# Patient Record
Sex: MALE | Race: BLACK OR AFRICAN AMERICAN | NOT HISPANIC OR LATINO | URBAN - METROPOLITAN AREA
[De-identification: names, ages, dates, MRNs, and addresses within clinical notes are randomized per-mention and may not be internally consistent; named-entity substitution may affect disease eponyms.]

---

## 2020-08-04 ENCOUNTER — OFFICE VISIT (OUTPATIENT)
Dept: URBAN - METROPOLITAN AREA CLINIC 33 | Facility: CLINIC | Age: 40
End: 2020-08-04

## 2020-08-04 VITALS
SYSTOLIC BLOOD PRESSURE: 140 MMHG | WEIGHT: 156 LBS | HEIGHT: 67 IN | DIASTOLIC BLOOD PRESSURE: 80 MMHG | BODY MASS INDEX: 24.48 KG/M2

## 2020-08-04 RX ORDER — MULTIVITAMIN
1 TABLET TABLET ORAL ONCE A DAY
Qty: 30 | Status: ACTIVE | COMMUNITY

## 2020-08-04 RX ORDER — PANTOPRAZOLE SODIUM 40 MG/1
1 TABLET TABLET, DELAYED RELEASE ORAL
Qty: 90 | Refills: 1 | OUTPATIENT
Start: 2020-08-04

## 2020-08-04 RX ORDER — PANTOPRAZOLE SODIUM 40 MG/1
TAKE 1 TABLET BY MOUTH EVERY DAY TABLET, DELAYED RELEASE ORAL
Qty: 30 UNSPECIFIED | Refills: 2 | Status: ACTIVE | COMMUNITY

## 2020-08-04 RX ORDER — ACETAMINOPHEN 500 MG
AS DIRECTED TABLET ORAL
Status: ON HOLD | COMMUNITY

## 2020-08-04 RX ORDER — NAPROXEN SODIUM 220 MG
AS DIRECTED TABLET ORAL
Status: ACTIVE | COMMUNITY

## 2020-08-04 NOTE — EXAM-MIGRATED EXAMINATIONS
GENERAL APPEARANCE: - alert, in no acute distress, well developed, well nourished;   HEAD: - normocephalic, atraumatic;   EYES: - sclera anicteric bilaterally;   ORAL CAVITY: - mucosa moist, MP 3;   NECK/THYROID: - neck supple, full range of motion, no thyromegaly, trachea midline;   SKIN: - warm and dry, no spider angiomata, palmar erythema or icterus;   HEART: - no murmurs, regular rate and rhythm, S1, S2 normal;   LUNGS: - clear to auscultation bilaterally, good air movement, no wheezes, rales, rhonchi;   ABDOMEN: - bowel sounds present, no masses palpable, no organomegaly , no rebound tenderness, soft, nontender, nondistended;   MUSCULOSKELETAL: - normal posture, normal gait and station, no decreased range of motion;   EXTREMITIES: - no clubbing, cyanosis, or edema;   PSYCH: - cooperative with exam, mood/affect full range;

## 2020-08-04 NOTE — HPI-MIGRATED HPI
;     Acid Reflux : 40 year old male presents today for consult due to longstanding history heartburn/reflux. Patient admits this first occurred 11 years ago, he was put on Nexium 40 mg. Since he has been off Nexium for 10 yrs. Things cleared and he has been doing well until March 2020. He received a temporary crown, and was taking Abx and Ibuprofen.   Recurrence of symptoms includes burning in chest, globus sensation and the feeling of food going down every time he swallows. He admits discomfort with dry food items like nuts.   No N/V.  No actual food getting stuck. No cough, no hoarseness. No wheezing. No abdominal pain.  He has BM's every day, 2 times a day. No blood in stool and no melena.  No weight loss.   Patient states that he is currently taking Pantoprazole 40 mg with relief of symptoms. It took 4-6 weeks after starting Pantoprazole before feeling better.    Denies a family history of esophageal and gastric cancers and/or diseases.    Patient has not had an EGD or Barium Swallow Test before.   ;

## 2020-08-27 ENCOUNTER — OFFICE VISIT (OUTPATIENT)
Dept: URBAN - METROPOLITAN AREA CLINIC 35 | Facility: CLINIC | Age: 40
End: 2020-08-27

## 2020-08-28 ENCOUNTER — OFFICE VISIT (OUTPATIENT)
Dept: URBAN - METROPOLITAN AREA CLINIC 35 | Facility: CLINIC | Age: 40
End: 2020-08-28

## 2020-09-03 ENCOUNTER — OFFICE VISIT (OUTPATIENT)
Dept: URBAN - METROPOLITAN AREA SURGERY CENTER 8 | Facility: SURGERY CENTER | Age: 40
End: 2020-09-03

## 2020-09-03 ENCOUNTER — TELEPHONE ENCOUNTER (OUTPATIENT)
Dept: URBAN - METROPOLITAN AREA CLINIC 35 | Facility: CLINIC | Age: 40
End: 2020-09-03

## 2020-09-03 RX ORDER — FLUCONAZOLE 200 MG/1
2 TABLETS TABLET ORAL ONCE
Qty: 2 | Refills: 0 | OUTPATIENT
Start: 2020-09-03

## 2020-09-03 RX ORDER — FLUCONAZOLE 200 MG/1
1 TABLET TABLET ORAL DAILY
Qty: 7 TABLET | Refills: 0 | OUTPATIENT
Start: 2020-09-03

## 2020-09-08 ENCOUNTER — TELEPHONE ENCOUNTER (OUTPATIENT)
Dept: URBAN - METROPOLITAN AREA CLINIC 35 | Facility: CLINIC | Age: 40
End: 2020-09-08

## 2020-09-16 ENCOUNTER — OFFICE VISIT (OUTPATIENT)
Dept: URBAN - METROPOLITAN AREA CLINIC 33 | Facility: CLINIC | Age: 40
End: 2020-09-16

## 2020-09-16 VITALS
HEIGHT: 67 IN | WEIGHT: 150 LBS | TEMPERATURE: 97.1 F | DIASTOLIC BLOOD PRESSURE: 80 MMHG | BODY MASS INDEX: 23.54 KG/M2 | SYSTOLIC BLOOD PRESSURE: 120 MMHG

## 2020-09-16 RX ORDER — FLUCONAZOLE 200 MG/1
1 TABLET TABLET ORAL DAILY
Qty: 7 TABLET | Refills: 0 | Status: ON HOLD | COMMUNITY
Start: 2020-09-03

## 2020-09-16 RX ORDER — NAPROXEN SODIUM 220 MG
AS DIRECTED TABLET ORAL
Status: ON HOLD | COMMUNITY

## 2020-09-16 RX ORDER — FLUCONAZOLE 200 MG/1
2 TABLETS TABLET ORAL ONCE
Qty: 2 | Refills: 0 | Status: ON HOLD | COMMUNITY
Start: 2020-09-03

## 2020-09-16 RX ORDER — PANTOPRAZOLE SODIUM 20 MG/1
1 TABLET TABLET, DELAYED RELEASE ORAL
Qty: 90 | Refills: 1 | OUTPATIENT
Start: 2020-09-16

## 2020-09-16 RX ORDER — PANTOPRAZOLE SODIUM 40 MG/1
1 TABLET TABLET, DELAYED RELEASE ORAL
Qty: 90 | Refills: 1 | Status: ACTIVE | COMMUNITY
Start: 2020-08-04

## 2020-09-16 RX ORDER — MULTIVITAMIN
1 TABLET TABLET ORAL ONCE A DAY
Qty: 30 | Status: ON HOLD | COMMUNITY

## 2020-09-16 RX ORDER — ACETAMINOPHEN 500 MG
AS DIRECTED TABLET ORAL
Status: ON HOLD | COMMUNITY

## 2020-09-16 RX ORDER — PANTOPRAZOLE SODIUM 40 MG/1
TAKE 1 TABLET BY MOUTH EVERY DAY TABLET, DELAYED RELEASE ORAL
Qty: 30 UNSPECIFIED | Refills: 2 | Status: ON HOLD | COMMUNITY

## 2021-01-13 ENCOUNTER — OFFICE VISIT (OUTPATIENT)
Dept: URBAN - METROPOLITAN AREA CLINIC 33 | Facility: CLINIC | Age: 41
End: 2021-01-13

## 2021-01-20 ENCOUNTER — OFFICE VISIT (OUTPATIENT)
Dept: URBAN - METROPOLITAN AREA CLINIC 33 | Facility: CLINIC | Age: 41
End: 2021-01-20

## 2021-01-20 VITALS
DIASTOLIC BLOOD PRESSURE: 88 MMHG | HEART RATE: 89 BPM | HEIGHT: 67 IN | SYSTOLIC BLOOD PRESSURE: 140 MMHG | TEMPERATURE: 97.3 F | BODY MASS INDEX: 24.64 KG/M2 | OXYGEN SATURATION: 98 % | WEIGHT: 157 LBS

## 2021-01-20 PROBLEM — 235595009: Status: ACTIVE | Noted: 2020-08-04

## 2021-01-20 RX ORDER — PANTOPRAZOLE SODIUM 40 MG/1
TAKE 1 TABLET BY MOUTH EVERY DAY TABLET, DELAYED RELEASE ORAL
Qty: 30 UNSPECIFIED | Refills: 2 | Status: ON HOLD | COMMUNITY

## 2021-01-20 RX ORDER — FLUCONAZOLE 200 MG/1
1 TABLET TABLET ORAL DAILY
Qty: 7 TABLET | Refills: 0 | Status: ON HOLD | COMMUNITY
Start: 2020-09-03

## 2021-01-20 RX ORDER — ACETAMINOPHEN 500 MG
AS DIRECTED TABLET ORAL
Status: ACTIVE | COMMUNITY

## 2021-01-20 RX ORDER — NAPROXEN SODIUM 220 MG
AS DIRECTED TABLET ORAL
Status: ON HOLD | COMMUNITY

## 2021-01-20 RX ORDER — PANTOPRAZOLE SODIUM 20 MG/1
1 TABLET TABLET, DELAYED RELEASE ORAL
Qty: 90 | Refills: 1 | Status: ACTIVE | COMMUNITY
Start: 2020-09-16

## 2021-01-20 RX ORDER — MULTIVITAMIN
1 TABLET TABLET ORAL ONCE A DAY
Qty: 30 | Status: ACTIVE | COMMUNITY

## 2021-01-20 RX ORDER — FLUCONAZOLE 200 MG/1
2 TABLETS TABLET ORAL ONCE
Qty: 2 | Refills: 0 | Status: ON HOLD | COMMUNITY
Start: 2020-09-03

## 2021-01-20 RX ORDER — TURMERIC 100 %
AS DIRECTED POWDER (GRAM) MISCELLANEOUS
Status: ACTIVE | COMMUNITY

## 2021-01-20 RX ORDER — PANTOPRAZOLE SODIUM 40 MG/1
1 TABLET TABLET, DELAYED RELEASE ORAL
Qty: 90 | Refills: 1 | Status: ON HOLD | COMMUNITY
Start: 2020-08-04

## 2021-01-20 RX ORDER — AMOXICILLIN 500 MG
AS DIRECTED CAPSULE ORAL
Status: ACTIVE | COMMUNITY

## 2021-01-20 RX ORDER — CHOLECALCIFEROL (VITAMIN D3) 25 MCG
1 TABLET TABLET,CHEWABLE ORAL ONCE A DAY
Qty: 30 | Status: ACTIVE | COMMUNITY

## 2021-01-20 NOTE — HPI-MIGRATED HPI
;     Acid Reflux : Patient presents today for follow up of GERD. He has tapered Pantoprazole 40 mg to 20 mg as discussed during last office visit. He denies breakthrough symptoms. Admits bowel habits are normal.  No heartburn, no dysphagia, no N/V. Admits to at least once a month he will get minor throat irritation. He has done very well on the decreased dose of Pantoprazole.  This was the plan from prior office visit: On his recent EGD, Distal esophageal bx's did show histologic findings of reflux esophagitis. Plan is to decrease dose of Pantoprazole to 20 mg once he finishes the 40 mg strength he has left at home. Will see how he does.     Last visit (9/16/2020) Patient presents today for follow up to his EGD. Since the procedure patient denies dysphagia, globus, changes in appetite, and changes in bowel habits.  He continues to take Pantoprazole 40 mg in the morning, and he has completed the course of Diflucan given for 7 days. He admits to mild irritation of his throat that occurs every other day, other than that, he denies burning in the esophagus, globus sensation, and he no longer feels his food when he swallows.   GI MD Note: significant improvement after Diflucan. The painful swallowing resolved. The heartburn resolved. At present time he has no active GI issues.  EGD and pathology as documented below and discussed with patient.  Last visit (8/4/2020) 40 year old male presents today for consult due to longstanding history heartburn/reflux. Patient admits this first occurred 11 years ago, he was put on Nexium 40 mg. Since he has been off Nexium for 10 yrs. Things cleared and he has been doing well until March 2020. He received a temporary crown, and was taking Abx and Ibuprofen.   Recurrence of symptoms includes burning in chest, globus sensation and the feeling of food going down every time he swallows. He admits discomfort with dry food items like nuts.   No N/V.  No actual food getting stuck. No cough, no hoarseness. No wheezing. No abdominal pain.  He has BM's every day, 2 times a day. No blood in stool and no melena.  No weight loss.   Patient states that he is currently taking Pantoprazole 40 mg with relief of symptoms. It took 4-6 weeks after starting Pantoprazole before feeling better.    Denies a family history of esophageal and gastric cancers and/or diseases.    Patient has not had an EGD or Barium Swallow Test before.   ;

## 2022-08-01 ENCOUNTER — WEB ENCOUNTER (OUTPATIENT)
Dept: URBAN - METROPOLITAN AREA CLINIC 33 | Facility: CLINIC | Age: 42
End: 2022-08-01

## 2022-08-01 RX ORDER — PANTOPRAZOLE SODIUM 20 MG/1
1 TABLET TABLET, DELAYED RELEASE ORAL
Qty: 90 TABLETS | Refills: 0 | OUTPATIENT
Start: 2022-08-03

## 2023-06-05 ENCOUNTER — WEB ENCOUNTER (OUTPATIENT)
Dept: URBAN - METROPOLITAN AREA CLINIC 33 | Facility: CLINIC | Age: 43
End: 2023-06-05

## 2023-06-05 RX ORDER — PANTOPRAZOLE SODIUM 20 MG/1
1 TABLET TABLET, DELAYED RELEASE ORAL
Qty: 90 TABLETS | Refills: 0
Start: 2022-08-03

## 2023-08-15 ENCOUNTER — OFFICE VISIT (OUTPATIENT)
Dept: URBAN - METROPOLITAN AREA CLINIC 35 | Facility: CLINIC | Age: 43
End: 2023-08-15

## 2023-08-23 ENCOUNTER — OFFICE VISIT (OUTPATIENT)
Dept: URBAN - METROPOLITAN AREA CLINIC 33 | Facility: CLINIC | Age: 43
End: 2023-08-23
Payer: OTHER GOVERNMENT

## 2023-08-23 ENCOUNTER — DASHBOARD ENCOUNTERS (OUTPATIENT)
Age: 43
End: 2023-08-23

## 2023-08-23 VITALS
WEIGHT: 157 LBS | HEIGHT: 67 IN | DIASTOLIC BLOOD PRESSURE: 78 MMHG | SYSTOLIC BLOOD PRESSURE: 138 MMHG | BODY MASS INDEX: 24.64 KG/M2 | HEART RATE: 82 BPM | OXYGEN SATURATION: 98 %

## 2023-08-23 DIAGNOSIS — Z12.11 COLON CANCER SCREENING: ICD-10-CM

## 2023-08-23 DIAGNOSIS — K21.9 GASTROESOPHAGEAL REFLUX DISEASE, ESOPHAGITIS PRESENCE NOT SPECIFIED: ICD-10-CM

## 2023-08-23 PROCEDURE — 99214 OFFICE O/P EST MOD 30 MIN: CPT | Performed by: INTERNAL MEDICINE

## 2023-08-23 RX ORDER — PANTOPRAZOLE SODIUM 20 MG/1
1 TABLET TABLET, DELAYED RELEASE ORAL
Qty: 90 | Refills: 3 | OUTPATIENT
Start: 2023-08-23

## 2023-08-23 RX ORDER — CHOLECALCIFEROL (VITAMIN D3) 25 MCG
1 TABLET TABLET,CHEWABLE ORAL ONCE A DAY
Qty: 30 | Status: ACTIVE | COMMUNITY

## 2023-08-23 RX ORDER — TURMERIC 100 %
AS DIRECTED POWDER (GRAM) MISCELLANEOUS
Status: ACTIVE | COMMUNITY

## 2023-08-23 RX ORDER — ACETAMINOPHEN 500 MG
AS DIRECTED TABLET ORAL
Status: ACTIVE | COMMUNITY

## 2023-08-23 RX ORDER — AMOXICILLIN 500 MG
AS DIRECTED CAPSULE ORAL
Status: ACTIVE | COMMUNITY

## 2023-08-23 RX ORDER — MULTIVITAMIN
1 TABLET TABLET ORAL ONCE A DAY
Qty: 30 | Status: ACTIVE | COMMUNITY

## 2023-08-23 RX ORDER — NAPROXEN SODIUM 220 MG
AS DIRECTED TABLET ORAL
Status: ON HOLD | COMMUNITY

## 2023-08-23 RX ORDER — FLUCONAZOLE 200 MG/1
2 TABLETS TABLET ORAL ONCE
Qty: 2 | Refills: 0 | Status: ON HOLD | COMMUNITY
Start: 2020-09-03

## 2023-08-23 RX ORDER — PANTOPRAZOLE SODIUM 20 MG/1
1 TABLET TABLET, DELAYED RELEASE ORAL
Qty: 90 TABLETS | Refills: 0 | Status: ACTIVE | COMMUNITY
Start: 2022-08-03

## 2023-08-23 RX ORDER — PANTOPRAZOLE SODIUM 40 MG/1
1 TABLET TABLET, DELAYED RELEASE ORAL
Qty: 90 | Refills: 1 | Status: ON HOLD | COMMUNITY
Start: 2020-08-04

## 2023-08-23 NOTE — HPI-ADOLESCENT DEPRESSION SCREENING
43 year old patient presents today for symptoms of acid reflux. Patient states that his last flare up was two weeks ago. He states that caffeine, chocolate and ibuprofen are his triggers.   Patient did well for over 1 year off medications for GERD. His trigger are caffeine, chocolate and NSAID's.  With flair ups, patient has significant heartburn, and has had 2 episodes of reflux so severe, he feels he can not breathe. Initial episode  2009,  and most recent few weeks ago before patient called us for a refill. No N/V No dysphagia No constipation. No blood in stools

## 2024-08-28 ENCOUNTER — OFFICE VISIT (OUTPATIENT)
Dept: URBAN - METROPOLITAN AREA CLINIC 33 | Facility: CLINIC | Age: 44
End: 2024-08-28

## 2024-08-28 ENCOUNTER — TELEPHONE ENCOUNTER (OUTPATIENT)
Dept: URBAN - METROPOLITAN AREA CLINIC 33 | Facility: CLINIC | Age: 44
End: 2024-08-28

## 2024-08-28 NOTE — HPI-TODAY'S VISIT:
44 year old male patient with previous history of GERD presents today for his 12 month follow-up.     Last appointment  Pantoprazole DR 20 mg QD  Last EGD 09/2020 with no complicating factors to acid reflux  Colonoscopy due when patient is 44

## 2024-09-11 ENCOUNTER — OFFICE VISIT (OUTPATIENT)
Dept: URBAN - METROPOLITAN AREA CLINIC 33 | Facility: CLINIC | Age: 44
End: 2024-09-11

## 2024-09-18 ENCOUNTER — OFFICE VISIT (OUTPATIENT)
Dept: URBAN - METROPOLITAN AREA CLINIC 35 | Facility: CLINIC | Age: 44
End: 2024-09-18

## 2024-09-18 NOTE — HPI-TODAY'S VISIT:
44 year old male patient with previous history of GERD presents today for his 12 month follow-up.   Last appointment  Pantoprazole DR 20 mg QD Last EGD 09/2020 with no complicating factors to acid reflux  Colonoscopy due when patient is 44.

## 2024-09-20 ENCOUNTER — WEB ENCOUNTER (OUTPATIENT)
Dept: URBAN - METROPOLITAN AREA CLINIC 33 | Facility: CLINIC | Age: 44
End: 2024-09-20

## 2024-10-08 ENCOUNTER — OFFICE VISIT (OUTPATIENT)
Dept: URBAN - METROPOLITAN AREA CLINIC 35 | Facility: CLINIC | Age: 44
End: 2024-10-08
Payer: OTHER GOVERNMENT

## 2024-10-08 ENCOUNTER — LAB OUTSIDE AN ENCOUNTER (OUTPATIENT)
Dept: URBAN - METROPOLITAN AREA CLINIC 35 | Facility: CLINIC | Age: 44
End: 2024-10-08

## 2024-10-08 ENCOUNTER — TELEPHONE ENCOUNTER (OUTPATIENT)
Dept: URBAN - METROPOLITAN AREA CLINIC 33 | Facility: CLINIC | Age: 44
End: 2024-10-08

## 2024-10-08 VITALS
OXYGEN SATURATION: 99 % | BODY MASS INDEX: 24.8 KG/M2 | WEIGHT: 158 LBS | HEIGHT: 67 IN | HEART RATE: 86 BPM | DIASTOLIC BLOOD PRESSURE: 80 MMHG | SYSTOLIC BLOOD PRESSURE: 130 MMHG

## 2024-10-08 DIAGNOSIS — R13.19 ODYNOPHAGIA: ICD-10-CM

## 2024-10-08 DIAGNOSIS — Z12.11 COLON CANCER SCREENING: ICD-10-CM

## 2024-10-08 DIAGNOSIS — K21.9 GASTROESOPHAGEAL REFLUX DISEASE, ESOPHAGITIS PRESENCE NOT SPECIFIED: ICD-10-CM

## 2024-10-08 DIAGNOSIS — R68.2 DRY MOUTH, UNSPECIFIED: ICD-10-CM

## 2024-10-08 PROBLEM — 30233002: Status: ACTIVE | Noted: 2024-10-08

## 2024-10-08 PROCEDURE — 99214 OFFICE O/P EST MOD 30 MIN: CPT | Performed by: INTERNAL MEDICINE

## 2024-10-08 RX ORDER — FLUCONAZOLE 200 MG/1
2 TABLETS TABLET ORAL ONCE
Qty: 2 | Refills: 0 | Status: ON HOLD | COMMUNITY
Start: 2020-09-03

## 2024-10-08 RX ORDER — ALUMINUM ZIRCONIUM TRICHLOROHYDREX GLY 0.19 G/G
1 TABLET 30 MINUTES BEFORE MORNING MEAL STICK TOPICAL ONCE A DAY
Status: ACTIVE | COMMUNITY

## 2024-10-08 RX ORDER — NAPROXEN SODIUM 220 MG
AS DIRECTED TABLET ORAL
Status: ON HOLD | COMMUNITY

## 2024-10-08 RX ORDER — TURMERIC 100 %
AS DIRECTED POWDER (GRAM) MISCELLANEOUS
Status: ACTIVE | COMMUNITY

## 2024-10-08 RX ORDER — PANTOPRAZOLE SODIUM 20 MG/1
1 TABLET TABLET, DELAYED RELEASE ORAL
Qty: 90 | Refills: 3 | Status: ON HOLD | COMMUNITY
Start: 2023-08-23

## 2024-10-08 RX ORDER — PANTOPRAZOLE SODIUM 40 MG/1
1 TABLET TABLET, DELAYED RELEASE ORAL
Qty: 90 | Refills: 1 | Status: ON HOLD | COMMUNITY
Start: 2020-08-04

## 2024-10-08 RX ORDER — CHOLECALCIFEROL (VITAMIN D3) 25 MCG
1 TABLET TABLET,CHEWABLE ORAL ONCE A DAY
Qty: 30 | Status: ACTIVE | COMMUNITY

## 2024-10-08 RX ORDER — PANTOPRAZOLE SODIUM 20 MG/1
1 TABLET TABLET, DELAYED RELEASE ORAL
Qty: 90 TABLETS | Refills: 0 | Status: ON HOLD | COMMUNITY
Start: 2022-08-03

## 2024-10-08 RX ORDER — ACETAMINOPHEN 500 MG
AS DIRECTED TABLET ORAL
Status: ACTIVE | COMMUNITY

## 2024-10-08 RX ORDER — MULTIVITAMIN
1 TABLET TABLET ORAL ONCE A DAY
Qty: 30 | Status: ACTIVE | COMMUNITY

## 2024-10-08 RX ORDER — AMOXICILLIN 500 MG
AS DIRECTED CAPSULE ORAL
Status: ACTIVE | COMMUNITY

## 2024-10-08 NOTE — HPI-TODAY'S VISIT:
44-year-old male patient with previous history of GERD presents today for dysphagia. Patient mentions esophageal dysphagia to liquids and solids. Patient mentions improvement of symptoms after quitting Pantoprazole. Patient mentions taking OTC Prilosec instead. Patient mentions taking Probiotic supplement with improvement of symptoms. Patient currently mentions 1-2 bowel movements per day with solid consistency without blood melena or pain.   3-4 months ago started having dry mouth, and the feeling of struggling to swallow saliva. Subsequently, started noticing awareness of liquids going down when drinking. It progressed to having mild discomfort  when swallowing food, and he pointed to upper esophagus. Patient quit Pantoprazole and started taking Throat Health Lozenges (streptococcus salivaris) and OTC Prilosec 20 mg, and that comboit improved symptoms significantly. At this point, he feels much better Last appointment - 2023 Pantoprazole DR 20 mg QD  Last EGD 09/2020 with no complicating factors to acid reflux  Colonoscopy due when patient is 44.

## 2024-10-19 ENCOUNTER — WEB ENCOUNTER (OUTPATIENT)
Dept: URBAN - METROPOLITAN AREA CLINIC 35 | Facility: CLINIC | Age: 44
End: 2024-10-19

## 2024-11-12 ENCOUNTER — OFFICE VISIT (OUTPATIENT)
Dept: URBAN - METROPOLITAN AREA CLINIC 35 | Facility: CLINIC | Age: 44
End: 2024-11-12

## 2024-12-11 ENCOUNTER — OFFICE VISIT (OUTPATIENT)
Dept: URBAN - METROPOLITAN AREA CLINIC 33 | Facility: CLINIC | Age: 44
End: 2024-12-11

## 2025-01-22 ENCOUNTER — OFFICE VISIT (OUTPATIENT)
Dept: URBAN - METROPOLITAN AREA CLINIC 33 | Facility: CLINIC | Age: 45
End: 2025-01-22

## 2025-01-22 NOTE — HPI-TODAY'S VISIT:
44 year old male patient with previous history of Odynophagia, GERD, Dry mouth presents today for his 3 month follow-up.     Last Appointment  [facility]  Barium swallow 10/14/24: Normal Esophagram

## 2025-03-26 ENCOUNTER — OFFICE VISIT (OUTPATIENT)
Dept: URBAN - METROPOLITAN AREA CLINIC 33 | Facility: CLINIC | Age: 45
End: 2025-03-26

## 2025-03-26 ENCOUNTER — TELEPHONE ENCOUNTER (OUTPATIENT)
Dept: URBAN - METROPOLITAN AREA CLINIC 97 | Facility: CLINIC | Age: 45
End: 2025-03-26

## 2025-04-02 ENCOUNTER — OFFICE VISIT (OUTPATIENT)
Dept: URBAN - METROPOLITAN AREA TELEHEALTH 2 | Facility: TELEHEALTH | Age: 45
End: 2025-04-02

## 2025-04-02 RX ORDER — FLUCONAZOLE 200 MG/1
2 TABLETS TABLET ORAL ONCE
Qty: 2 | Refills: 0 | Status: ON HOLD | COMMUNITY
Start: 2020-09-03

## 2025-04-02 RX ORDER — CHOLECALCIFEROL (VITAMIN D3) 25 MCG
1 TABLET TABLET,CHEWABLE ORAL ONCE A DAY
Qty: 30 | Status: ACTIVE | COMMUNITY

## 2025-04-02 RX ORDER — AMOXICILLIN 500 MG
AS DIRECTED CAPSULE ORAL
Status: ACTIVE | COMMUNITY

## 2025-04-02 RX ORDER — ACETAMINOPHEN 500 MG
AS DIRECTED TABLET ORAL
Status: ACTIVE | COMMUNITY

## 2025-04-02 RX ORDER — NAPROXEN SODIUM 220 MG
AS DIRECTED TABLET ORAL
Status: ON HOLD | COMMUNITY

## 2025-04-02 RX ORDER — PANTOPRAZOLE SODIUM 20 MG/1
1 TABLET TABLET, DELAYED RELEASE ORAL
Qty: 90 | Refills: 3 | Status: ON HOLD | COMMUNITY
Start: 2023-08-23

## 2025-04-02 RX ORDER — ALUMINUM ZIRCONIUM TRICHLOROHYDREX GLY 0.19 G/G
1 TABLET 30 MINUTES BEFORE MORNING MEAL STICK TOPICAL ONCE A DAY
Status: ACTIVE | COMMUNITY

## 2025-04-02 RX ORDER — PANTOPRAZOLE SODIUM 40 MG/1
1 TABLET TABLET, DELAYED RELEASE ORAL
Qty: 90 | Refills: 1 | Status: ON HOLD | COMMUNITY
Start: 2020-08-04

## 2025-04-02 RX ORDER — MULTIVITAMIN
1 TABLET TABLET ORAL ONCE A DAY
Qty: 30 | Status: ACTIVE | COMMUNITY

## 2025-04-02 RX ORDER — PANTOPRAZOLE SODIUM 20 MG/1
1 TABLET TABLET, DELAYED RELEASE ORAL
Qty: 90 TABLETS | Refills: 0 | Status: ON HOLD | COMMUNITY
Start: 2022-08-03

## 2025-04-02 RX ORDER — TURMERIC 100 %
AS DIRECTED POWDER (GRAM) MISCELLANEOUS
Status: ACTIVE | COMMUNITY

## 2025-04-02 NOTE — HPI-TODAY'S VISIT:
44 year old male patient with previous history of Odynophagia, GERD, Dry mouth presents today for his 3 month follow-up. Patient is taking prilosec OTC PRN as his hearburn and odynophagia have completely resolved. He currently mentnions 3 bowel movements per day with solid consistency without blood melena or pain.    Last Appointment Prilosec OTC  Barium swallow 10/14/24: Normal Esophagram

## 2025-05-08 ENCOUNTER — OFFICE VISIT (OUTPATIENT)
Dept: URBAN - METROPOLITAN AREA SURGERY CENTER 8 | Facility: SURGERY CENTER | Age: 45
End: 2025-05-08
Payer: OTHER GOVERNMENT

## 2025-05-08 DIAGNOSIS — Z12.11 ENCOUNTER FOR SCREENING FOR MALIGNANT NEOPLASM OF COLON: ICD-10-CM

## 2025-05-08 DIAGNOSIS — Z12.11 COLON CANCER SCREENING: ICD-10-CM

## 2025-05-08 PROCEDURE — G0121 COLON CA SCRN NOT HI RSK IND: HCPCS | Performed by: INTERNAL MEDICINE

## 2025-05-08 PROCEDURE — 0528F RCMND FLW-UP 10 YRS DOCD: CPT | Performed by: INTERNAL MEDICINE

## 2025-05-08 PROCEDURE — 00812 ANES LWR INTST SCR COLSC: CPT | Performed by: NURSE ANESTHETIST, CERTIFIED REGISTERED

## 2025-05-08 RX ORDER — PANTOPRAZOLE SODIUM 20 MG/1
1 TABLET TABLET, DELAYED RELEASE ORAL
Qty: 90 TABLETS | Refills: 0 | Status: ON HOLD | COMMUNITY
Start: 2022-08-03

## 2025-05-08 RX ORDER — CHOLECALCIFEROL (VITAMIN D3) 25 MCG
1 TABLET TABLET,CHEWABLE ORAL ONCE A DAY
Qty: 30 | Status: ACTIVE | COMMUNITY

## 2025-05-08 RX ORDER — PANTOPRAZOLE SODIUM 40 MG/1
1 TABLET TABLET, DELAYED RELEASE ORAL
Qty: 90 | Refills: 1 | Status: ON HOLD | COMMUNITY
Start: 2020-08-04

## 2025-05-08 RX ORDER — TURMERIC 100 %
AS DIRECTED POWDER (GRAM) MISCELLANEOUS
Status: ACTIVE | COMMUNITY

## 2025-05-08 RX ORDER — PANTOPRAZOLE SODIUM 20 MG/1
1 TABLET TABLET, DELAYED RELEASE ORAL
Qty: 90 | Refills: 3 | Status: ON HOLD | COMMUNITY
Start: 2023-08-23

## 2025-05-08 RX ORDER — ALUMINUM ZIRCONIUM TRICHLOROHYDREX GLY 0.19 G/G
1 TABLET 30 MINUTES BEFORE MORNING MEAL STICK TOPICAL ONCE A DAY
Status: ACTIVE | COMMUNITY

## 2025-05-08 RX ORDER — AMOXICILLIN 500 MG
AS DIRECTED CAPSULE ORAL
Status: ACTIVE | COMMUNITY

## 2025-05-08 RX ORDER — MULTIVITAMIN
1 TABLET TABLET ORAL ONCE A DAY
Qty: 30 | Status: ACTIVE | COMMUNITY

## 2025-05-08 RX ORDER — ACETAMINOPHEN 500 MG
AS DIRECTED TABLET ORAL
Status: ACTIVE | COMMUNITY

## 2025-05-08 RX ORDER — NAPROXEN SODIUM 220 MG
AS DIRECTED TABLET ORAL
Status: ON HOLD | COMMUNITY

## 2025-05-08 RX ORDER — FLUCONAZOLE 200 MG/1
2 TABLETS TABLET ORAL ONCE
Qty: 2 | Refills: 0 | Status: ON HOLD | COMMUNITY
Start: 2020-09-03

## 2025-05-22 ENCOUNTER — OFFICE VISIT (OUTPATIENT)
Dept: URBAN - METROPOLITAN AREA CLINIC 35 | Facility: CLINIC | Age: 45
End: 2025-05-22

## 2025-05-22 ENCOUNTER — TELEPHONE ENCOUNTER (OUTPATIENT)
Dept: URBAN - METROPOLITAN AREA CLINIC 35 | Facility: CLINIC | Age: 45
End: 2025-05-22

## 2025-05-22 NOTE — HPI-TODAY'S VISIT:
44 year old male patient presents today for colonoscopy follow up. Patient had colonoscopy completed on _____, with results noted below. Patient admits /denies any complications after procedure.  Patient currently admits normal bowel habits. Patient denies rectal bleeding, melena, or mucus.  COLONOSCOPY 05.08.2025 IMPRESSION The examined portion of the ileum was normal. The entire examined colon is normal. Internal hemorrhoids. No specimens collected. Path Report Shows   Last Visit 10.18.2024 44 year old male patient with previous history of Odynophagia, GERD, Dry mouth presents today for his 3 month follow-up. Patient is taking prilosec OTC PRN as his hearburn and odynophagia have completely resolved. He currently mentnions 3 bowel movements per day with solid consistency without blood melena or pain.    Last Appointment Prilosec OTC  Barium swallow 10/14/24: Normal Esophagram